# Patient Record
Sex: MALE | Race: WHITE | Employment: FULL TIME | URBAN - METROPOLITAN AREA
[De-identification: names, ages, dates, MRNs, and addresses within clinical notes are randomized per-mention and may not be internally consistent; named-entity substitution may affect disease eponyms.]

---

## 2021-07-06 ENCOUNTER — HOSPITAL ENCOUNTER (INPATIENT)
Age: 31
LOS: 7 days | Discharge: HOME | DRG: 776 | End: 2021-07-14
Attending: EMERGENCY MEDICINE | Admitting: PSYCHIATRY & NEUROLOGY
Payer: MEDICAID

## 2021-07-06 DIAGNOSIS — F32.A DEPRESSION WITH SUICIDAL IDEATION: Primary | ICD-10-CM

## 2021-07-06 DIAGNOSIS — R45.851 DEPRESSION WITH SUICIDAL IDEATION: Primary | ICD-10-CM

## 2021-07-06 PROCEDURE — 99284 EMERGENCY DEPT VISIT MOD MDM: CPT

## 2021-07-06 PROCEDURE — 80179 DRUG ASSAY SALICYLATE: CPT

## 2021-07-06 PROCEDURE — 85025 COMPLETE CBC W/AUTO DIFF WBC: CPT

## 2021-07-06 PROCEDURE — 80143 DRUG ASSAY ACETAMINOPHEN: CPT

## 2021-07-06 PROCEDURE — 36415 COLL VENOUS BLD VENIPUNCTURE: CPT

## 2021-07-06 PROCEDURE — 82077 ASSAY SPEC XCP UR&BREATH IA: CPT

## 2021-07-06 PROCEDURE — 80053 COMPREHEN METABOLIC PANEL: CPT

## 2021-07-07 PROBLEM — F32.A DEPRESSION: Status: ACTIVE | Noted: 2021-07-07

## 2021-07-07 PROBLEM — R45.851 DEPRESSION WITH SUICIDAL IDEATION: Status: ACTIVE | Noted: 2021-07-07

## 2021-07-07 PROBLEM — F32.A DEPRESSION WITH SUICIDAL IDEATION: Status: ACTIVE | Noted: 2021-07-07

## 2021-07-07 LAB
ALBUMIN SERPL-MCNC: 4.4 G/DL (ref 3.5–5)
ALBUMIN/GLOB SERPL: 1.1 {RATIO} (ref 1.1–2.2)
ALP SERPL-CCNC: 105 U/L (ref 45–117)
ALT SERPL-CCNC: 20 U/L (ref 12–78)
AMPHET UR QL SCN: NEGATIVE
ANION GAP SERPL CALC-SCNC: 5 MMOL/L (ref 5–15)
APAP SERPL-MCNC: <10 UG/ML (ref 10–30)
AST SERPL W P-5'-P-CCNC: 12 U/L (ref 15–37)
BARBITURATES UR QL SCN: NEGATIVE
BASOPHILS # BLD: 0 K/UL (ref 0–0.1)
BASOPHILS NFR BLD: 0 % (ref 0–1)
BENZODIAZ UR QL: NEGATIVE
BILIRUB SERPL-MCNC: 0.6 MG/DL (ref 0.2–1)
BUN SERPL-MCNC: 8 MG/DL (ref 6–20)
BUN/CREAT SERPL: 9 (ref 12–20)
CA-I BLD-MCNC: 9.5 MG/DL (ref 8.5–10.1)
CANNABINOIDS UR QL SCN: POSITIVE
CHLORIDE SERPL-SCNC: 103 MMOL/L (ref 97–108)
CO2 SERPL-SCNC: 26 MMOL/L (ref 21–32)
COCAINE UR QL SCN: NEGATIVE
CREAT SERPL-MCNC: 0.9 MG/DL (ref 0.7–1.3)
DATE LAST DOSE: ABNORMAL
DATE LAST DOSE: NORMAL
DIFFERENTIAL METHOD BLD: NORMAL
DRUG SCRN COMMENT,DRGCM: ABNORMAL
EOSINOPHIL # BLD: 0.1 K/UL (ref 0–0.4)
EOSINOPHIL NFR BLD: 1 % (ref 0–7)
ERYTHROCYTE [DISTWIDTH] IN BLOOD BY AUTOMATED COUNT: 13.9 % (ref 11.5–14.5)
ETHANOL SERPL-MCNC: <4 MG/DL
GLOBULIN SER CALC-MCNC: 4.1 G/DL (ref 2–4)
GLUCOSE SERPL-MCNC: 107 MG/DL (ref 65–100)
HCT VFR BLD AUTO: 45.3 % (ref 36.6–50.3)
HGB BLD-MCNC: 15.3 G/DL (ref 12.1–17)
IMM GRANULOCYTES # BLD AUTO: 0 K/UL (ref 0–0.04)
IMM GRANULOCYTES NFR BLD AUTO: 0 % (ref 0–0.5)
LYMPHOCYTES # BLD: 2.5 K/UL (ref 0.8–3.5)
LYMPHOCYTES NFR BLD: 24 % (ref 12–49)
MCH RBC QN AUTO: 30 PG (ref 26–34)
MCHC RBC AUTO-ENTMCNC: 33.8 G/DL (ref 30–36.5)
MCV RBC AUTO: 88.8 FL (ref 80–99)
METHADONE UR QL: NEGATIVE
MONOCYTES # BLD: 0.7 K/UL (ref 0–1)
MONOCYTES NFR BLD: 6 % (ref 5–13)
NEUTS SEG # BLD: 7.2 K/UL (ref 1.8–8)
NEUTS SEG NFR BLD: 69 % (ref 32–75)
NRBC # BLD: 0 K/UL (ref 0–0.01)
NRBC BLD-RTO: 0 PER 100 WBC
OPIATES UR QL: NEGATIVE
PCP UR QL: NEGATIVE
PLATELET # BLD AUTO: 254 K/UL (ref 150–400)
PMV BLD AUTO: 11.6 FL (ref 8.9–12.9)
POTASSIUM SERPL-SCNC: 3.2 MMOL/L (ref 3.5–5.1)
PROT SERPL-MCNC: 8.5 G/DL (ref 6.4–8.2)
RBC # BLD AUTO: 5.1 M/UL (ref 4.1–5.7)
REPORTED DOSE,DOSE: ABNORMAL UNITS
REPORTED DOSE,DOSE: NORMAL UNITS
SALICYLATES SERPL-MCNC: 5.4 MG/DL (ref 2.8–20)
SARS-COV-2, COV2: NOT DETECTED
SODIUM SERPL-SCNC: 134 MMOL/L (ref 136–145)
WBC # BLD AUTO: 10.5 K/UL (ref 4.1–11.1)

## 2021-07-07 PROCEDURE — 80307 DRUG TEST PRSMV CHEM ANLYZR: CPT

## 2021-07-07 PROCEDURE — 87635 SARS-COV-2 COVID-19 AMP PRB: CPT

## 2021-07-07 PROCEDURE — 65220000003 HC RM SEMIPRIVATE PSYCH

## 2021-07-07 PROCEDURE — 74011250637 HC RX REV CODE- 250/637: Performed by: PSYCHIATRY & NEUROLOGY

## 2021-07-07 RX ORDER — OLANZAPINE 5 MG/1
5 TABLET ORAL
Status: DISCONTINUED | OUTPATIENT
Start: 2021-07-07 | End: 2021-07-14 | Stop reason: HOSPADM

## 2021-07-07 RX ORDER — HYDROXYZINE 50 MG/1
50 TABLET, FILM COATED ORAL
Status: DISCONTINUED | OUTPATIENT
Start: 2021-07-07 | End: 2021-07-14 | Stop reason: HOSPADM

## 2021-07-07 RX ORDER — IBUPROFEN 200 MG
1 TABLET ORAL DAILY
Status: DISCONTINUED | OUTPATIENT
Start: 2021-07-07 | End: 2021-07-14 | Stop reason: HOSPADM

## 2021-07-07 RX ORDER — ACETAMINOPHEN 325 MG/1
650 TABLET ORAL
Status: DISCONTINUED | OUTPATIENT
Start: 2021-07-07 | End: 2021-07-14 | Stop reason: HOSPADM

## 2021-07-07 RX ORDER — BENZTROPINE MESYLATE 1 MG/1
1 TABLET ORAL
Status: DISCONTINUED | OUTPATIENT
Start: 2021-07-07 | End: 2021-07-14 | Stop reason: HOSPADM

## 2021-07-07 RX ORDER — CHLORDIAZEPOXIDE HYDROCHLORIDE 10 MG/1
10 CAPSULE, GELATIN COATED ORAL 3 TIMES DAILY
Status: DISCONTINUED | OUTPATIENT
Start: 2021-07-07 | End: 2021-07-07

## 2021-07-07 RX ORDER — TRAZODONE HYDROCHLORIDE 50 MG/1
50 TABLET ORAL
Status: DISCONTINUED | OUTPATIENT
Start: 2021-07-07 | End: 2021-07-14 | Stop reason: HOSPADM

## 2021-07-07 RX ORDER — OLANZAPINE 2.5 MG/1
2.5 TABLET ORAL 2 TIMES DAILY
Status: DISCONTINUED | OUTPATIENT
Start: 2021-07-07 | End: 2021-07-14 | Stop reason: HOSPADM

## 2021-07-07 RX ORDER — ADHESIVE BANDAGE
30 BANDAGE TOPICAL DAILY PRN
Status: DISCONTINUED | OUTPATIENT
Start: 2021-07-07 | End: 2021-07-14 | Stop reason: HOSPADM

## 2021-07-07 RX ADMIN — OLANZAPINE 2.5 MG: 2.5 TABLET, FILM COATED ORAL at 12:54

## 2021-07-07 RX ADMIN — OLANZAPINE 2.5 MG: 2.5 TABLET, FILM COATED ORAL at 21:29

## 2021-07-07 NOTE — BH NOTES
Karyna Carias is a 28 YO  Male admitted from [facility] ED to [facility] under the psychiatric care of Dr. Rolf Saldana. Voluntary. DX:  Depression with Suicidal Ideations. Denies Psychiatric History. Denies medical history. NKA. Patient escorted onto unit by ED staff in a wheelchair. Compliant with safety search by this RN and Anthony Person. /91 HR 81 Temp 97.7 RR 18. Patient presents with inconsistent eye contact, unkempt. Reports increasing depression, decreased appetite \"I haven't eaten in 4 days. I'm starving, but I cannot eat\", decreased sleep. Reports auditory hallucinations, \"voices telling me to do bad things\". Endorses \"years\" of hearing voices. Family history of Schizophrenia, Bipolar - aunts and cousins. Patient denies substance abuse. UDS +THC. Denies alcohol abuse. Smokes 2 ppd. Patient reports marital problems, he and his wife  on Saturday. His mother is his support system, Xi Pelayo is the one that found me curled up in the woods and brought me to the ER\". Patient denies history of sexual, emotional and/or physical abuse. Employed as a  (patios, etc). Patient provided with unit handbook and oriented to unit. Provided toiletries and linens. Asked appropriate questions, polite, calm. Dr. Rolf Saldana contacted for orders. Q15 minute observation maintained for safety. Encouraged patient to seek support from staff as needed. Will follow treatment plan as written and modify as needed.

## 2021-07-07 NOTE — ED PROVIDER NOTES
EMERGENCY DEPARTMENT HISTORY AND PHYSICAL EXAM      Date: 7/6/2021  Patient Name: Tiffanie Wilkinson      History of Presenting Illness     Chief Complaint   Patient presents with    Suicidal       History Provided By: Patient    HPI: Tiffanie Wilkinson, 27 y.o. male with a past medical history significant No significant past medical history presents to the ED with cc of suicidal ideation for past few days. Patient has several plans are to kill himself. However he does not give it a specific one. Patient denies ingestion. Patient denies self injury. Patient denies any other complaints at this time. There are no other complaints, changes, or physical findings at this time. PCP: None        Past History     Past Medical History:  Past Medical History:   Diagnosis Date    Asthma        Past Surgical History:  Past Surgical History:   Procedure Laterality Date    HX TONSILLECTOMY         Family History:  History reviewed. No pertinent family history. Social History:  Social History     Tobacco Use    Smoking status: Current Every Day Smoker    Smokeless tobacco: Never Used   Substance Use Topics    Alcohol use: Not Currently    Drug use: Yes     Types: Marijuana       Allergies:  No Known Allergies      Review of Systems     Review of Systems   Constitutional: Negative. HENT: Negative. Eyes: Negative. Respiratory: Negative. Cardiovascular: Negative. Gastrointestinal: Negative. Endocrine: Negative. Genitourinary: Negative. Musculoskeletal: Negative. Skin: Negative. Allergic/Immunologic: Negative. Neurological: Negative. Hematological: Negative. Psychiatric/Behavioral: Positive for agitation, behavioral problems, decreased concentration and suicidal ideas. The patient is nervous/anxious. All other systems reviewed and are negative. Physical Exam     Physical Exam  Vitals and nursing note reviewed.    Constitutional:       General: He is not in acute distress. Appearance: Normal appearance. He is normal weight. He is not ill-appearing, toxic-appearing or diaphoretic. HENT:      Head: Normocephalic and atraumatic. Right Ear: Tympanic membrane and ear canal normal.      Left Ear: Tympanic membrane and ear canal normal.      Nose: Nose normal. No congestion or rhinorrhea. Mouth/Throat:      Pharynx: Oropharynx is clear. No oropharyngeal exudate or posterior oropharyngeal erythema. Eyes:      General: No scleral icterus. Right eye: No discharge. Left eye: No discharge. Extraocular Movements: Extraocular movements intact. Conjunctiva/sclera: Conjunctivae normal.      Pupils: Pupils are equal, round, and reactive to light. Cardiovascular:      Rate and Rhythm: Normal rate and regular rhythm. Pulses: Normal pulses. Heart sounds: Normal heart sounds. Pulmonary:      Effort: Pulmonary effort is normal. No respiratory distress. Breath sounds: Normal breath sounds. No stridor. No wheezing, rhonchi or rales. Chest:      Chest wall: No tenderness. Abdominal:      General: Abdomen is flat. Bowel sounds are normal. There is no distension. Palpations: Abdomen is soft. Tenderness: There is no abdominal tenderness. There is no right CVA tenderness, left CVA tenderness, guarding or rebound. Musculoskeletal:         General: No swelling, tenderness, deformity or signs of injury. Normal range of motion. Cervical back: Normal range of motion and neck supple. No rigidity or tenderness. Right lower leg: No edema. Left lower leg: No edema. Lymphadenopathy:      Cervical: No cervical adenopathy. Skin:     General: Skin is warm and dry. Coloration: Skin is not jaundiced or pale. Findings: No bruising, erythema, lesion or rash. Neurological:      General: No focal deficit present. Mental Status: He is alert and oriented to person, place, and time.  Mental status is at baseline. Cranial Nerves: No cranial nerve deficit. Sensory: No sensory deficit. Motor: No weakness. Coordination: Coordination normal.      Gait: Gait normal.   Psychiatric:         Thought Content: Thought content normal.         Judgment: Judgment normal.      Comments: Patient is depressed. Patient is a slightly anxious and agitated. He continues to have suicidal ideation. He has said that he is has multiple plan how to kill himself. Lab and Diagnostic Study Results     Labs -     Recent Results (from the past 12 hour(s))   CBC WITH AUTOMATED DIFF    Collection Time: 07/06/21 11:10 PM   Result Value Ref Range    WBC 10.5 4.1 - 11.1 K/uL    RBC 5.10 4. 10 - 5.70 M/uL    HGB 15.3 12.1 - 17.0 g/dL    HCT 45.3 36.6 - 50.3 %    MCV 88.8 80.0 - 99.0 FL    MCH 30.0 26.0 - 34.0 PG    MCHC 33.8 30.0 - 36.5 g/dL    RDW 13.9 11.5 - 14.5 %    PLATELET 056 097 - 138 K/uL    MPV 11.6 8.9 - 12.9 FL    NRBC 0.0 0.0  WBC    ABSOLUTE NRBC 0.00 0.00 - 0.01 K/uL    NEUTROPHILS 69 32 - 75 %    LYMPHOCYTES 24 12 - 49 %    MONOCYTES 6 5 - 13 %    EOSINOPHILS 1 0 - 7 %    BASOPHILS 0 0 - 1 %    IMMATURE GRANULOCYTES 0 0 - 0.5 %    ABS. NEUTROPHILS 7.2 1.8 - 8.0 K/UL    ABS. LYMPHOCYTES 2.5 0.8 - 3.5 K/UL    ABS. MONOCYTES 0.7 0.0 - 1.0 K/UL    ABS. EOSINOPHILS 0.1 0.0 - 0.4 K/UL    ABS. BASOPHILS 0.0 0.0 - 0.1 K/UL    ABS. IMM.  GRANS. 0.0 0.00 - 0.04 K/UL    DF AUTOMATED     METABOLIC PANEL, COMPREHENSIVE    Collection Time: 07/06/21 11:10 PM   Result Value Ref Range    Sodium 134 (L) 136 - 145 mmol/L    Potassium 3.2 (L) 3.5 - 5.1 mmol/L    Chloride 103 97 - 108 mmol/L    CO2 26 21 - 32 mmol/L    Anion gap 5 5 - 15 mmol/L    Glucose 107 (H) 65 - 100 mg/dL    BUN 8 6 - 20 mg/dL    Creatinine 0.90 0.70 - 1.30 mg/dL    BUN/Creatinine ratio 9 (L) 12 - 20      GFR est AA >60 >60 ml/min/1.73m2    GFR est non-AA >60 >60 ml/min/1.73m2    Calcium 9.5 8.5 - 10.1 mg/dL    Bilirubin, total 0.6 0.2 - 1.0 mg/dL AST (SGOT) 12 (L) 15 - 37 U/L    ALT (SGPT) 20 12 - 78 U/L    Alk. phosphatase 105 45 - 117 U/L    Protein, total 8.5 (H) 6.4 - 8.2 g/dL    Albumin 4.4 3.5 - 5.0 g/dL    Globulin 4.1 (H) 2.0 - 4.0 g/dL    A-G Ratio 1.1 1.1 - 2.2     ETHYL ALCOHOL    Collection Time: 07/06/21 11:10 PM   Result Value Ref Range    ALCOHOL(ETHYL),SERUM <4 <01 mg/dL   SALICYLATE    Collection Time: 07/06/21 11:10 PM   Result Value Ref Range    Salicylate level 5.4 2.8 - 20.0 mg/dL    Reported dose date Not provided      Reported dose: Not provided Units   ACETAMINOPHEN    Collection Time: 07/06/21 11:10 PM   Result Value Ref Range    Acetaminophen level <10 (L) 10 - 30 ug/mL    Reported dose date Not provided      Reported dose: Not provided Units   DRUG SCREEN, URINE    Collection Time: 07/07/21 12:36 AM   Result Value Ref Range    AMPHETAMINES Negative Negative      BARBITURATES Negative Negative      BENZODIAZEPINES Negative Negative      COCAINE Negative Negative      METHADONE Negative Negative      OPIATES Negative Negative      PCP(PHENCYCLIDINE) Negative Negative      THC (TH-CANNABINOL) Positive (A) Negative      Drug screen comment        This test is a screen for drugs of abuse in a medical setting only (i.e., they are unconfirmed results and as such must not be used for non-medical purposes, e.g.,employment testing, legal testing). Due to its inherent nature, false positive (FP) and false negative (FN) results may be obtained. Therefore, if necessary for medical care, recommend confirmation of positive findings by GC/MS. Radiologic Studies -   [unfilled]  CT Results  (Last 48 hours)    None        CXR Results  (Last 48 hours)    None          Medical Decision Making and ED Course   - I am the first and primary provider for this patient AND AM THE PRIMARY PROVIDER OF RECORD.     - I reviewed the vital signs, available nursing notes, past medical history, past surgical history, family history and social history. - Initial assessment performed. The patients presenting problems have been discussed, and the staff are in agreement with the care plan formulated and outlined with them. I have encouraged them to ask questions as they arise throughout their visit. Vital Signs-Reviewed the patient's vital signs. Patient Vitals for the past 12 hrs:   Temp Pulse Resp BP SpO2   07/06/21 2351     99 %   07/06/21 2233 98.4 °F (36.9 °C) 64 14 (!) 146/91 99 %       EKG interpretation: (Preliminary):     Records Reviewed: Nursing Notes        ED Course:              Provider Notes (Medical Decision Making):     MDM  Number of Diagnoses or Management Options  Depression with suicidal ideation: new, needed workup  Diagnosis management comments: Differential diagnosis include suicidal ideation, ingestion, drug overdose, self injury, bipolar disorder, schizophrenia. Amount and/or Complexity of Data Reviewed  Clinical lab tests: ordered and reviewed  Tests in the medicine section of CPT®: reviewed and ordered    Risk of Complications, Morbidity, and/or Mortality  Presenting problems: high  Diagnostic procedures: high  Management options: high  General comments: Patient remained stable throughout the course of treatment. Labs were unremarkable. UDS was positive for cannabis. We will medically clear patient at 3:17 AM for psych admissions. Case discussed with Dr. Mark Thorne, psychiatrist on-call. She accepted the patient's. Will admit patient to her service. Consultations:       Consultations:         Procedures and Critical Care       }    NOTES   :  I have spent critical care time involved in lab review, consultations with specialist, family decision- making, bedside attention and documentation. This time excludes time spent in any separate billed procedures. During this entire length of time I was immediately available to the patient .     Lindsey Ferreira DO        Disposition     Disposition: Condition stable and improved    Admitted        Diagnosis     Clinical Impression:   1. Depression with suicidal ideation        Attestations:    Costa Nava, DO    Please note that this dictation was completed with [employer], the computer voice recognition software. Quite often unanticipated grammatical, syntax, homophones, and other interpretive errors are inadvertently transcribed by the computer software. Please disregard these errors. Please excuse any errors that have escaped final proofreading. Thank you.
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
## 2021-07-07 NOTE — GROUP NOTE
ISABEL  GROUP DOCUMENTATION INDIVIDUAL                                                                          Group Therapy Note    Date: 7/7/2021    Group Start Time: 1100  Group End Time: 1200  Group Topic: Education Group - Inpatient    [facility]    Poly Melvin    IP [address] [facility] GROUP DOCUMENTATION GROUP    Group Therapy Note    Attendees: Patients educated and completed safety plans. Patients encouraged to discuss their warning sighs, coping skills, and support systems. Patients encouraged to discuss openly and honestly and also be respectful and supportive of their peers. Attendance: Attended late    Patient's Goal:  Attends activities and groups    Interventions/techniques: Informed, Validated, Promoted peer support, Reinforced and Supported    Follows Directions: Followed directions    Interactions: Interacted appropriately    Mental Status: Calm and Congruent    Behavior/appearance: Attentive and Withdrawn/quiet    Goals Achieved: Able to listen to others      Additional Notes:  Pt attended group with only a few minutes left. Pt reports that he had been speaking with the doctor. Pt provided with safety plan and instructed on how to fill it out and to keep it to give to his . Pt reported understanding and reported that he would fill it out today.     Chai Pederson

## 2021-07-07 NOTE — ED NOTES
Pt's room psych proofed. Pt's belongings placed in belongings bag and stored at nurses  2 with pt's label. Pt in green gown. Observer monitoring sheet started on pt. Pt's mother at bedside. No signs of acute distress noted.

## 2021-07-07 NOTE — ED TRIAGE NOTES
SI \"I'm done walking around here. \" AH command telling Pt to do bad things. Chronic depression and Suicidal thoughts, recent change, \"being more open about it. \" No specific plan.

## 2021-07-07 NOTE — ED NOTES
TRANSFER - OUT REPORT:    Verbal report given to Britney(name) on [facility]  being transferred to [address] [place] [address](unit) for routine progression of care       Report consisted of patients Situation, Background, Assessment and   Recommendations(SBAR). Information from the following report(s) SBAR was reviewed with the receiving nurse. Lines:       Opportunity for questions and clarification was provided.       Patient transported with:   N[place]ble

## 2021-07-08 PROCEDURE — 74011250637 HC RX REV CODE- 250/637: Performed by: PSYCHIATRY & NEUROLOGY

## 2021-07-08 PROCEDURE — 65220000003 HC RM SEMIPRIVATE PSYCH

## 2021-07-08 RX ORDER — CITALOPRAM 10 MG/1
10 TABLET ORAL DAILY
Status: DISCONTINUED | OUTPATIENT
Start: 2021-07-09 | End: 2021-07-14 | Stop reason: HOSPADM

## 2021-07-08 RX ADMIN — OLANZAPINE 2.5 MG: 2.5 TABLET, FILM COATED ORAL at 09:06

## 2021-07-08 RX ADMIN — OLANZAPINE 2.5 MG: 2.5 TABLET, FILM COATED ORAL at 21:43

## 2021-07-08 NOTE — CONSULTS
History & Physical    Primary Care Provider: None  Source of Information: Patient/family     History of Presenting Illness:   Adonay Fuentes is a 27 y.o. male who was admitted to the behavioral health unit yesterday with suicidal ideation. Medical history is significant only for asthma as a young child. Review of Systems:  A comprehensive review of systems was negative except for that written in the History of Present Illness. Past Medical History:   Diagnosis Date    Asthma         Past Surgical History:   Procedure Laterality Date    HX TONSILLECTOMY         Prior to Admission medications    Not on File       No Known Allergies     History reviewed. No pertinent family history. Social History     Socioeconomic History    Marital status: LEGALLY      Spouse name: Not on file    Number of children: Not on file    Years of education: Not on file    Highest education level: Not on file   Tobacco Use    Smoking status: Current Every Day Smoker    Smokeless tobacco: Never Used   Substance and Sexual Activity    Alcohol use: Not Currently    Drug use: Yes     Types: Marijuana    Sexual activity: Yes     Partners: Female     Birth control/protection: None     Social Determinants of Health     Financial Resource Strain:     Difficulty of Paying Living Expenses:    Food Insecurity:     Worried About Running Out of Food in the Last Year:     [address] in the Last Year:    Transportation Needs:     Lack of Transportation (Medical):      Lack of Transportation (Non-Medical):    Physical Activity:     Days of Exercise per Week:     Minutes of Exercise per Session:    Stress:     Feeling of Stress :    Social Connections:     Frequency of Communication with Friends and Family:     Frequency of Social Gatherings with Friends and Family:     Attends Jewish Services:     Active Member of Clubs or Organizations:     Attends Club or Organization Meetings:     Marital Status:             CODE STATUS:  DNR    Full    Other      Objective:     Physical Exam:     Visit Vitals  /76 (BP 1 Location: Left upper arm, BP Patient Position: At rest;Lying)   Pulse 81   Temp 98.6 °F (37 °C)   Resp 17   Ht 5' 10\" (1.778 m)   Wt 63.5 kg (140 lb)   SpO2 99%   BMI 20.09 kg/m²      O2 Device: None (Room air)    General:  Alert, cooperative, no distress, appears stated age. Head:  Normocephalic, without obvious abnormality, atraumatic. Eyes:  Conjunctivae/corneas clear. PERRL, EOMs intact. Nose: Nares normal. Septum midline. Mucosa normal. No drainage or sinus tenderness. Throat: Lips, mucosa, and tongue normal. Teeth and gums normal.   Neck: Supple, symmetrical, trachea midline, no adenopathy, thyroid: no enlargement/tenderness/nodules, no carotid bruit and no JVD. Back:   Symmetric, no curvature. ROM normal. No CVA tenderness. Lungs:   Clear to auscultation bilaterally. Chest wall:  No tenderness or deformity. Heart:  Regular rate and rhythm, S1, S2 normal, no murmur, click, rub or gallop. Abdomen:   Soft, non-tender. Bowel sounds normal. No masses,  No organomegaly. Extremities: Extremities normal, atraumatic, no cyanosis or edema. Pulses: 2+ and symmetric all extremities. Skin: Skin color, texture, turgor normal. No rashes or lesions   Neurologic: CNII-XII intact. No motor or sensory deficits. 24 Hour Results:    No results found for this or any previous visit (from the past 24 hour(s)).       Imaging:   No orders to display           Assessment:   History of mild intermittent asthma, stable    Depression with suicidal ideation      Plan:   Patient is stable from medical standpoint    Home medications were reviewed    Signed By: Suhas Gallegos MD     July 8, 2021
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
## 2021-07-08 NOTE — BH NOTES
PSA PART II ADDITIONAL INFORMATION        Access To Fire Arms: No    Substance Use: YES    Last Use: 7/6/21    Type of Substance: THC use    Frequency of Use: daily    Request to See : NO    If yes, notified : NO    Release of Information Signed: YES    Release of Information Signed For: momZuleyka [phone], wife, Karlo Dong (mom will have number)- can only confirm safety of her and children, no location to be shared.

## 2021-07-08 NOTE — PROGRESS NOTES
Problem: Depressed Mood (Adult/Pediatric)  Goal: *STG: Remains safe in hospital  Outcome: Progressing Towards Goal     Problem: Depressed Mood (Adult/Pediatric)  Goal: *STG: Complies with medication therapy  Outcome: Progressing Towards Goal     Patient has been safe in the hospital.  Patient was medication compliant. Patient remains on close observation. Patient has been alert, oriented, cooperative and pleasant on approach. Patient spent much if his time in bed. He attended music group. Patient said his mood was fine. He said he is mostly tired from his thoughts and from not eating. Medication compliant. Continue to assess. Since going to bed, patient has been laying down quietly with his eyes closed.
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
## 2021-07-08 NOTE — BH NOTES
Patient up to the dayroom for meals, ate 100%. Returned back to his room after meals. Medication compliant. No side effects noted. Pleasant on approach. Denies SI/HI. Denies AVH thus far today. Attends select groups. No physical complaints voiced. Presently sitting in the dayroom with peers. Remains on CO. Continue to monitor.

## 2021-07-08 NOTE — GROUP NOTE
[facility] GROUP DOCUMENTATION INDIVIDUAL                                                                          Group Therapy Note    Date: 7/8/2021    Group Start Time: 1300  Group End Time: 1400  Group Topic: Education Group - Inpatient    [facility] MANAGEMENT    [place]    [facility] GROUP DOCUMENTATION GROUP    Group Therapy Note    Attendees: 7/13    Patients were asked how they were feeling as a check-in question. Therapist facilitated group to address what trauma is and the common reactions to trauma. Pts were encouraged to share experiences with trauma and talk about healthy ways of coping. Attendance: Attended    Patient's Goal:  Pt was able to respond to check-in question that they were feeling negative and angry    Interventions/techniques: Informed, Validated and Supported    Follows Directions: Followed directions    Interactions: Interacted appropriately    Mental Status: Calm and Congruent    Behavior/appearance: Attentive and Cooperative    Goals Achieved: Able to engage in interactions, Able to listen to others, Able to give feedback to another, Able to reflect/comment on own behavior and Able to manage/cope with feelings      Additional Notes:  Pt was very attentive during group. Pt was able to empathize with another peer and display empathy to others. Pt was ble to self-disclose and identify trauma as well as feelings and triggers.     Kala Brush

## 2021-07-08 NOTE — GROUP NOTE
IP  GROUP DOCUMENTATION INDIVIDUAL                                                                          Group Therapy Note    Date: 7/7/2021    Group Start Time: 1945  Group End Time: 2030  Group Topic: Recreational/Music Therapy    [facility] 2  NON ACUTE    Allegra Slater    IP [address] [facility] GROUP DOCUMENTATION GROUP    Group Therapy Note    Attendees: 6/11  Introduce healthy leisure task skill as positive way to cope and manage stress         Attendance: Attended    Patient's Goal: STG: Attends activities and groups        Interventions/techniques: Art integration and Supported    Follows Directions: Followed directions    Interactions: Interacted appropriately    Mental Status: Calm and Flat    Behavior/appearance: Attentive and Cooperative    Goals Achieved: Able to engage in interactions and Able to listen to others      Additional Notes: Pt attended group and actively participated. Was receptive to intervention. Was able to receive leisure packet. Pt sat quietly in group entire session. Used leisure time constructively.      MAURICIO AguirreS

## 2021-07-08 NOTE — GROUP NOTE
[facility]  GROUP DOCUMENTATION INDIVIDUAL                                                                          Group Therapy Note    Date: 7/8/2021    Group Start Time: 1400  Group End Time: 1500  Group Topic: Special Track - Drug Topic    [facility]    Poly Melvin    IP [address] [facility] GROUP DOCUMENTATION GROUP    Group Therapy Note    Attendees: Patients discussed the protracted withdrawal worksheet. Patients informed on what protracted withdrawal is and ways to combat it. Patients encouraged to share openly and honestly. Patients encouraged to support each other and be respectful. Attendance: Did not attend        Additional Notes:  Pt encouraged but did not attend group.     Lucho Smith

## 2021-07-08 NOTE — GROUP NOTE
[facility] GROUP DOCUMENTATION INDIVIDUAL                                                                          Group Therapy Note    Date: 7/8/2021    Group Start Time: 1115  Group End Time: 1200  Group Topic: Process Group - Inpatient    [facility] 2 BEHA HLTH ACUTE    Charline Kumar  GROUP DOCUMENTATION GROUP    Group Therapy Note    Attendees: 4/12    Process: Pts were encouraged to share something they want to let go of as a check in question. Pts were then encouraged to share what brought them to the hospital and items discussed was environmental factors and support systems.  Pts were then asked to reflect on group         Attendance: Did not attend  Additional Notes:  Pt was encouraged to attend but chose not to do so    [employer]

## 2021-07-09 PROCEDURE — 74011250637 HC RX REV CODE- 250/637: Performed by: PSYCHIATRY & NEUROLOGY

## 2021-07-09 PROCEDURE — 65220000003 HC RM SEMIPRIVATE PSYCH

## 2021-07-09 RX ADMIN — OLANZAPINE 2.5 MG: 2.5 TABLET, FILM COATED ORAL at 21:58

## 2021-07-09 RX ADMIN — CITALOPRAM HYDROBROMIDE 10 MG: 10 TABLET ORAL at 08:37

## 2021-07-09 RX ADMIN — OLANZAPINE 2.5 MG: 2.5 TABLET, FILM COATED ORAL at 08:37
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
## 2021-07-09 NOTE — PROGRESS NOTES
Problem: Depressed Mood (Adult/Pediatric)  Goal: *STG: Verbalizes anger, guilt, and other feelings in a constructive manor  Outcome: Progressing Towards Goal     Problem: Depressed Mood (Adult/Pediatric)  Goal: *STG: Remains safe in hospital  Outcome: Progressing Towards Goal     Problem: Depressed Mood (Adult/Pediatric)  Goal: *STG: Complies with medication therapy  Outcome: Progressing Towards Goal     Patient was able to verbalize his feelings. Patient remains on close observation. Patient has been alert, oriented, cooperative and pleasant. Patient has been up on the unit. Patient was medication compliant. Since going to bed, patient has been laying down quietly with his eyes closed.

## 2021-07-09 NOTE — BH NOTES
Patient up to the dayroom for meals, ate 100%. Medication compliant. No side effects noted. Stated that he has blackouts & he does not remember what he did before admission to the hospital. Attends groups. Social with select peers. No physical complaints voiced. Denies SI/HI. Denies AVH. Remains on CO. Continue to monitor.

## 2021-07-09 NOTE — GROUP NOTE
IP  GROUP DOCUMENTATION INDIVIDUAL                                                                          Group Therapy Note    Date: 7/8/2021    Group Start Time: 2000  Group End Time: 2045  Group Topic: Recreational/Music Therapy    [facility] 2  NON ACUTE    Jose Silva    IP [address] [facility] GROUP DOCUMENTATION GROUP    Group Therapy Note    Attendees: 7/13  Introduced healthy leisure task skill group as positive way to cope and manage mood. Attendance: Attended    Patient's Goal: STG: Attends activities and groups      Interventions/techniques: Art integration and Supported    Follows Directions: Followed directions    Interactions: Interacted appropriately    Mental Status: Calm and Flat    Behavior/appearance: Attentive and Cooperative    Goals Achieved: Able to engage in interactions and Able to listen to others      Additional Notes: Attended group and actively participated. Accepted leisure activity packet and worked on task in group. Was able to select songs to listen to in group. Interacted with staff and peers. Used leisure time constructively.      TEMITOPE Lu

## 2021-07-09 NOTE — BH NOTES
Collateral:    Writer contacted pt's mother, Purnima Johnson, who was very guarded with sharing information. She reported finding pt in the woods because his work friends told her the area they dropped him off at. Writer explained that pt signed MONSTER for his wife, Danial Torrez, to confirm if she and family were safe. Pt gave permission for Mcihelle to provide writer with Tere's phone number as she has pt's phone. Michelle reported she did not want to do that. Writer educated her on pt having the right to make this decision, and that she only had permission to confirm safety. Michelle reported seeing Razia Zheng outside of her ex's house, which is a few blocks from Razia Zheng and pt's home. Michelle requested writer call back in 30 minutes. Writer called back and Michelle reported the landlord had spoken with Razia Zheng, but now cannot get in touch with her and provided the Satya Garcia known phone number for her\" [phone]. Writer contacted two times from different numbers and phone is set to \" do not disturb\". Michelle shared that police call or come to her home daily to request location of pt, but she will only state that he is in the hospital, and not which one. Writer encouraged Michelle to cooperate with police if a crime has been committed as she indicated pt is likely to go to detention. Michelle would not share details, but agreed to provide police with this writer's phone number as they continue to come to the home looking for pt.

## 2021-07-09 NOTE — GROUP NOTE
[facility]  GROUP DOCUMENTATION INDIVIDUAL                                                                          Group Therapy Note    Date: 7/9/2021    Group Start Time: 1100  Group End Time: 1200  Group Topic: Process Group - Inpatient    [facility]    Poly Melvin    IP [address] [facility] GROUP DOCUMENTATION GROUP    Group Therapy Note    Attendees: Patients encouraged to discuss the things that brought him to the hospital, the things that have been bothering them, the things that have been weighing them down. Patients encouraged to share openly and honestly and to bee respectful and supportive of their peers. Themes surrounding self judgement and controlling their emotions emerged and were discussed. Attendance: Attended    Patient's Goal:  Attends activities and groups    Interventions/techniques: Validated, Promoted peer support, Reinforced and Supported    Follows Directions: Followed directions    Interactions: Interacted appropriately    Mental Status: Calm and Congruent    Behavior/appearance: Attentive, withdrawn/quiet    Goals Achieved: Able to listen to others      Additional Notes:  Pt attended group and was engaged. Pt was withdrawn throughout and quiet throughout but was seen nodding along to peers.     Wally Ovalle

## 2021-07-09 NOTE — PROGRESS NOTES
Progress Note  Date:2021       Room:[facility]  Patient Baylee Gentile     YOB: 1990     Age:30 y.o. Subjective    Subjective   Mr. Graf 66-year-old  male who reports feeling slightly better. He denies having any active hallucinations or hearing voices today as much. He states he was able to sleep slightly better. He states he does not remember what happened and reports having blacked out and hearing voices before coming into the hospital.  He reports he has been hearing voices for a long time. Been bad things but has never got help. He currently denies having any suicidal or homicidal feelings. Sleep and appetite has been fair. Mental status examination-patient is alert oriented to name place person presents anxious states to himself. At the time of admission presented with increasing depression and suicidal ideation and anxious with reported auditory hallucinations. He had reported auditory hallucinations voices telling him to do good things and bad things. He did not elaborate further. Insight judgment coping remains impaired, but improving  Review of Systems  Objective         Vitals Last 24 Hours:  TEMPERATURE:  Temp  Av.6 °F (37 °C)  Min: 98.6 °F (37 °C)  Max: 98.6 °F (37 °C)  RESPIRATIONS RANGE: Resp  Av  Min: 17  Max: 17  PULSE OXIMETRY RANGE: No data recorded  PULSE RANGE: Pulse  Av  Min: 81  Max: 81  BLOOD PRESSURE RANGE: Systolic (07UWJ), BYC:978 , Min:119 , LWT:744   ; Diastolic (99JVY), YXF:41, Min:76, Max:76    I/O (24Hr):   No intake or output data in the 24 hours ending 21  Objective  Labs/Imaging/Diagnostics    Labs:  CBC:  Recent Labs     21  2310   WBC 10.5   RBC 5.10   HGB 15.3   HCT 45.3   MCV 88.8   RDW 13.9        CHEMISTRIES:  Recent Labs     21  2310   *   K 3.2*      CO2 26   BUN 8   CA 9.5   PT/INR:No results for input(s): INR, INREXT in the last 72 hours. No lab exists for component: PROTIME  APTT:No results for input(s): APTT in the last 72 hours. LIVER PROFILE:  Recent Labs     07/06/21  2310   AST 12*   ALT 20     Lab Results   Component Value Date/Time    ALT (SGPT) 20 07/06/2021 11:10 PM    AST (SGOT) 12 (L) 07/06/2021 11:10 PM    Alk. phosphatase 105 07/06/2021 11:10 PM    Bilirubin, total 0.6 07/06/2021 11:10 PM       Imaging Last 24 Hours:  No results found. Assessment//Plan   Active Problems:    Depression with suicidal ideation (7/7/2021)      Depression (7/7/2021)      Assessment & Plan  Continue process group  Reaching out to family for safety of others and to obtain further collateral relating to his reasons for hospitalization and other psychosocial and legal situations. Reason for his underlying trigger to his mental health worsening. Has been tolerating his medications.   No SI HI  Family meeting reviewed  No side effects from medications reported  Start Celexa 10 mg p.o. daily to address depression anxiety  Continue Zyprexa 2.5 twice daily to address his mood reported hallucinations      Current Facility-Administered Medications:     [START ON 7/9/2021] citalopram (CELEXA) tablet 10 mg, 10 mg, Oral, DAILY, Chelsie Macias MD    OLANZapine (ZyPREXA) tablet 5 mg, 5 mg, Oral, Q6H PRN, Ady Parekh MD    benztropine (COGENTIN) tablet 1 mg, 1 mg, Oral, BID PRN, Ady Parekh MD    hydrOXYzine HCL (ATARAX) tablet 50 mg, 50 mg, Oral, TID PRN, Ady Parekh MD    traZODone (DESYREL) tablet 50 mg, 50 mg, Oral, QHS PRN, Ady Parekh MD    acetaminophen (TYLENOL) tablet 650 mg, 650 mg, Oral, Q4H PRN, Chelsie Macias MD    magnesium hydroxide (MILK OF MAGNESIA) 400 mg/5 mL oral suspension 30 mL, 30 mL, Oral, DAILY PRN, Chelsie Macias MD    nicotine (NICODERM CQ) 21 mg/24 hr patch 1 Patch, 1 Patch, TransDERmal, DAILY, Chelsie Macias MD, 1 Patch at 07/08/21 0906    OLANZapine (ZyPREXA) tablet 2.5 mg, 2.5 mg, Oral, BID, Chelsie Macias MD, 2.5 mg at 07/08/21 4408  Electronically signed by Molly Brown MD on 7/8/2021 at 8:11 PM

## 2021-07-09 NOTE — GROUP NOTE
IP  GROUP DOCUMENTATION INDIVIDUAL                                                                          Group Therapy Note    Date: 7/9/2021    Group Start Time: 1300  Group End Time: 1400  Group Topic: Education Group - Inpatient    [facility] CARE MANAGEMENT    Alonzo Clifton    IP [address] [facility] GROUP DOCUMENTATION GROUP    Group Therapy Note    Attendees: 6/14    Pts were encouraged to say how they were feeling as a check-in question. Pts were given Mindfulness & Meditation worksheet. Pts were encouraged to participate in a guided meditation to address the focus of breathing. Pts were then asked to share after the activity how they felt afterwards and to compare to how they felt before the meditation. Attendance: Attended    Patient's Goal:  Pt responded to check-in question that he was feeling negative, pt did not want to self-disclose about negative feelings    Interventions/techniques: Informed, Validated and Supported    Follows Directions: Followed directions    Interactions: Interacted appropriately    Mental Status: Calm and Congruent    Behavior/appearance: Attentive and Cooperative    Goals Achieved: Able to engage in interactions, Able to listen to others, Able to give feedback to another and Able to reflect/comment on own behavior      Additional Notes:  Pt was attentive during group. Pt was calm and did not speak during group. Pt appeared to be upset about something.  Pt participated in guided meditation    Ryan Graves

## 2021-07-10 PROCEDURE — 74011250637 HC RX REV CODE- 250/637: Performed by: PSYCHIATRY & NEUROLOGY

## 2021-07-10 PROCEDURE — 65220000003 HC RM SEMIPRIVATE PSYCH

## 2021-07-10 RX ADMIN — CITALOPRAM HYDROBROMIDE 10 MG: 10 TABLET ORAL at 08:44

## 2021-07-10 RX ADMIN — OLANZAPINE 2.5 MG: 2.5 TABLET, FILM COATED ORAL at 22:03

## 2021-07-10 RX ADMIN — OLANZAPINE 2.5 MG: 2.5 TABLET, FILM COATED ORAL at 08:44

## 2021-07-10 NOTE — PROGRESS NOTES
Progress Note  Date: 2021       Room:[facility]  Patient Mag Paulson     YOB: 1990     Age:30 y.o. Subjective    Subjective   Patient reports he has been feeling better with his anxiety. He is not hearing any voices. He wants to know more about his medication. She denies remembering anything and that he had a blood drawn. He states he may go to half-way. He would not elaborate further. Mental status examination-patient is alert oriented to name place person presents anxious but much more engaging in conversation denies any suicidal homicidal ideations today denies any auditory visual hallucinations. No evidence of any active psychosis noted. Insight judgment and coping remains impaired but continuing to improve  Review of Systems  Objective         Vitals Last 24 Hours:  TEMPERATURE:  Temp  Av.1 °F (36.7 °C)  Min: 97.8 °F (36.6 °C)  Max: 98.4 °F (36.9 °C)  RESPIRATIONS RANGE: Resp  Av  Min: 18  Max: 18  PULSE OXIMETRY RANGE: SpO2  Av %  Min: 100 %  Max: 100 %  PULSE RANGE: Pulse  Av.3  Min: 55  Max: 70  BLOOD PRESSURE RANGE: Systolic (10FSU), SDY:321 , Min:109 , EGH:701   ; Diastolic (65BDC), FAD:10, Min:60, Max:69    I/O (24Hr): No intake or output data in the 24 hours ending 07/10/21 1133  Objective  Labs/Imaging/Diagnostics    Labs:  CBC:No results for input(s): WBC, RBC, HGB, HCT, MCV, RDW, PLT, HGBEXT, HCTEXT, PLTEXT in the last 72 hours. CHEMISTRIES:No results for input(s): NA, K, CL, CO2, BUN, CA, PHOS, MG in the last 72 hours. No lab exists for component: CREATININE, GLUCOSEPT/INR:No results for input(s): INR, INREXT in the last 72 hours. No lab exists for component: PROTIME  APTT:No results for input(s): APTT in the last 72 hours. LIVER PROFILE:No results for input(s): AST, ALT in the last 72 hours.     No lab exists for component: BILIDIR, BILITOT, [facility]  Lab Results   Component Value Date/Time    ALT (SGPT) 20 07/06/2021 11:10 PM    AST (SGOT) 12 (L) 07/06/2021 11:10 PM    Alk. phosphatase 105 07/06/2021 11:10 PM    Bilirubin, total 0.6 07/06/2021 11:10 PM       Imaging Last 24 Hours:  No results found. Assessment//Plan   Active Problems:    Depression with suicidal ideation (7/7/2021)      Depression (7/7/2021)      Assessment & Plan  Provided support psychoeducation  Continue to encourage to sharing information that he feels comfortable. He does admit to tolerating medications and medicines helping him. Continue to obtain further collateral  Patient was discussed in the treatment team meeting. No active SI HI voiced     no statements no persecutory delusions noted or reported.         Current Facility-Administered Medications:     citalopram (CELEXA) tablet 10 mg, 10 mg, Oral, DAILY, Chelsie Macias MD, 10 mg at 07/10/21 0844    OLANZapine (ZyPREXA) tablet 5 mg, 5 mg, Oral, Q6H PRN, Nick Li MD    benztropine (COGENTIN) tablet 1 mg, 1 mg, Oral, BID PRN, Nick Li MD    hydrOXYzine HCL (ATARAX) tablet 50 mg, 50 mg, Oral, TID PRN, Nick Li MD    traZODone (DESYREL) tablet 50 mg, 50 mg, Oral, QHS PRN, Nick Li MD    acetaminophen (TYLENOL) tablet 650 mg, 650 mg, Oral, Q4H PRN, Chelsie Macias MD    magnesium hydroxide (MILK OF MAGNESIA) 400 mg/5 mL oral suspension 30 mL, 30 mL, Oral, DAILY PRN, Chelsie Macias MD    nicotine (NICODERM CQ) 21 mg/24 hr patch 1 Patch, 1 Patch, TransDERmal, DAILY, Chelsie Macias MD, 1 Patch at 07/10/21 0843    OLANZapine (ZyPREXA) tablet 2.5 mg, 2.5 mg, Oral, BID, Nick Li MD, 2.5 mg at 07/10/21 0887    lectronically signed by Osei Talbert MD on 7/10/2021 at 11:33 AM

## 2021-07-10 NOTE — BH NOTES
Patient pleasant upon approach, affect wnl, with good eye contact. Patient was medication compliant, and social with writer. He denied SI, HI, AH, VH, depression and anxiety. Patient reported having AH and SI, only when he was angry. Patient remains on close observation, Q 15 minute checks.

## 2021-07-10 NOTE — GROUP NOTE
IP  GROUP DOCUMENTATION INDIVIDUAL                                                                          Group Therapy Note    Date: 7/9/2021    Group Start Time: 2000  Group End Time: 2045  Group Topic: Recreational/Music Therapy    [facility] 2 BH NON ACUTE    Roselee Batter    IP [facility] GROUP DOCUMENTATION GROUP    Group Therapy Note    Attendees: 6/15  Introduced healthy leisure task skill as positive way to cope and manage stress. .          Attendance: Attended    Patient's Goal: STG: Attends activities and groups      Interventions/techniques: Art integration and Supported    Follows Directions: Followed directions    Interactions: Interacted appropriately    Mental Status: Calm and Flat    Behavior/appearance: Attentive and Cooperative    Goals Achieved: Able to engage in interactions and Able to listen to others      Additional Notes:  Pt attended group and actively participated. Received leisure activity packet. Selected songs to listen to in group and worked on puzzles and art with encouragement.  Was receptive to intervention    Will Braswell , [address]

## 2021-07-10 NOTE — GROUP NOTE
ISABEL  GROUP DOCUMENTATION INDIVIDUAL                                                                          Group Therapy Note    Date: 7/10/2021    Group Start Time: 1300  Group End Time: 1400  Group Topic: Education Group - Inpatient    [facility] 2 [facility] HLTH ACUTE    Charline Kumar    IS[facility] GROUP DOCUMENTATION GROUP    Group Therapy Note    Attendees: 3/15    Process: Pts were asked what goals they are hoping to achieve as a check in question. Pts were then encouraged to participate in an activity regarding goals. Pts were asked to share goals they have made some progress towards and goals they have achieved. Pts were then asked to reflect on group         Attendance: Attended    Patient's Goal:  Pt said that he is proud of himself for coming here to get help    Interventions/techniques: Validated, Promoted peer support, Provide feedback and Supported    Follows Directions: Followed directions    Interactions: Interacted appropriately    Mental Status: Calm, Congruent and Flat    Behavior/appearance: Attentive, Motivated, Neatly groomed and Withdrawn/quiet    Goals Achieved: Able to engage in interactions, Able to listen to others, Able to give feedback to another, Able to reflect/comment on own behavior and Able to manage/cope with feelings      Additional Notes:  Pt spoke about how this is his first time receiving help. Pt discussed wanting to work on his anxiety and anger management. 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  Pt is making progress towards goals by attending and participating in group    [facility]

## 2021-07-11 PROCEDURE — 65220000003 HC RM SEMIPRIVATE PSYCH

## 2021-07-11 PROCEDURE — 74011250637 HC RX REV CODE- 250/637: Performed by: PSYCHIATRY & NEUROLOGY

## 2021-07-11 RX ADMIN — OLANZAPINE 2.5 MG: 2.5 TABLET, FILM COATED ORAL at 09:00

## 2021-07-11 RX ADMIN — OLANZAPINE 2.5 MG: 2.5 TABLET, FILM COATED ORAL at 21:13

## 2021-07-11 RX ADMIN — CITALOPRAM HYDROBROMIDE 10 MG: 10 TABLET ORAL at 09:00

## 2021-07-11 RX ADMIN — TRAZODONE HYDROCHLORIDE 50 MG: 50 TABLET ORAL at 21:13

## 2021-07-11 NOTE — GROUP NOTE
[facility] GROUP DOCUMENTATION INDIVIDUAL                                                                          Group Therapy Note    Date: 7/11/2021    Group Start Time: 1300  Group End Time: 1400  Group Topic: Education Group - Inpatient    [facility] 2  NON ACUTE    Kia Handing    [facility] GROUP DOCUMENTATION GROUP    Group Therapy Note    Attendees: 6/15  Introduce Defense Mechanisms, define Defense Mechanisms,and identify ways pt has used [employer] as way to coping with stressors and verbalize positive ways of coping. Attendance: Attended    Patient's Goal: STG: Demonstrates reduction in symptoms and increase in insight into coping skills/future focused         Interventions/techniques: Informed, Provide feedback and Supported    Follows Directions: Followed directions    Interactions: Interacted appropriately    Mental Status: Calm and Flat    Behavior/appearance: Attentive and Cooperative    Goals Achieved: Able to engage in interactions and Able to listen to others      Additional Notes: Pt attended group and actively participated in group discussion. Was able to relate to use of defense mechanisms and able to give personal examples. PT verbalized use of avoidance to help him manage difficult situations. PT had difficulty identifying positive ways of coping.      Surya Summers

## 2021-07-11 NOTE — BH NOTES
Patient quietly active on the unit. Affect broad at times, pleasant upon approach. Patient was medication compliant. He denied SI, HI, AH, VH, depression and anxiety. Patient was moved into 239 due to the unpredictabilility of his room mate, patient was compliant with this move. He remains on close observation, Q 15 minute checks.

## 2021-07-11 NOTE — PROGRESS NOTES
Problem: Depressed Mood (Adult/Pediatric)  Goal: *STG: Remains safe in hospital  Outcome: Progressing Towards Goal     Problem: Depressed Mood (Adult/Pediatric)  Goal: *STG: Complies with medication therapy  Outcome: Progressing Towards Goal     Patient has been safe in the hospital.  Patient was medication complaint. Patient remains on close observation. Patient has been alert, oriented, cooperative and pleasant. Patient has been up on the unit. Patient has been alert, oriented, cooperative and pleasant. Patient has not voiced any depression, anxiety, SI or HI. Patient was medication compliant. Continue to assess. Since going to bed, patient has been laying down quietly with his eyes closed.

## 2021-07-11 NOTE — GROUP NOTE
[facility]  GROUP DOCUMENTATION INDIVIDUAL                                                                          Group Therapy Note    Date: 7/11/2021    Group Start Time: 1100  Group End Time: 2913  Group Topic: Process Group - Inpatient    [facility] CARE MANAGEMENT    James Sensor    IP [employer] GROUP DOCUMENTATION GROUP    Group Therapy Note    Pts participated in process group therapy. Pts began the group with a check in questions and why they were brought to the hospital. Therapist and pts processed through their emotions, thought and feelings during group. Group closed with a closing question.      Attendees: 4/15         Attendance: Did not attend        Lucy Other

## 2021-07-12 PROCEDURE — 74011250637 HC RX REV CODE- 250/637: Performed by: PSYCHIATRY & NEUROLOGY

## 2021-07-12 PROCEDURE — 65220000003 HC RM SEMIPRIVATE PSYCH

## 2021-07-12 RX ADMIN — OLANZAPINE 2.5 MG: 2.5 TABLET, FILM COATED ORAL at 10:15

## 2021-07-12 RX ADMIN — CITALOPRAM HYDROBROMIDE 10 MG: 10 TABLET ORAL at 10:15

## 2021-07-12 RX ADMIN — OLANZAPINE 2.5 MG: 2.5 TABLET, FILM COATED ORAL at 21:57

## 2021-07-12 NOTE — BH NOTES
Patient alert and oriented. Ate breakfast in solarium. Ambulating in hallway. Socializing while in solarium. Denies suicidal or homicidal ideations. Denies auditory or visual hallucinations. Took medications as ordered. No distress noted.

## 2021-07-12 NOTE — GROUP NOTE
IS[facility]  GROUP DOCUMENTATION INDIVIDUAL                                                                          Group Therapy Note    Date: 7/12/2021    Group Start Time: 1100  Group End Time: 1200  Group Topic: Process Group - Inpatient    [facility] OP    Poly Melvin GROUP DOCUMENTATION GROUP    Group Therapy Note    Attendees: Patients encouraged to discuss the things that brought them to the hospital, the things that have been bothering them, the things that have been weighing them down. Patients encouraged to share openly and honestly. Patients encouraged to be respectful and supportive of their peers. Themes surrounding family, judgement, and self control emerged and were discussed. Attendance: Attended    Patient's Goal:  Attends activities and groups    Interventions/techniques: Validated, Promoted peer support, Reinforced and Supported    Follows Directions: Followed directions    Interactions: Interacted appropriately    Mental Status: Calm and Congruent    Behavior/appearance: Attentive and Cooperative    Goals Achieved: Able to engage in interactions, Able to listen to others, Able to reflect/comment on own behavior, Able to self-disclose, Discussed coping and Identified feelings      Additional Notes:  Pt attended group and was engaged. Pt shared that he feels that he is ready to go home. Pt shared that he knows he is ready as he feels much better than prior to admission, reports feeling stagnant and ready to take on next steps.     Elizabeth Skill

## 2021-07-12 NOTE — BH NOTES
Behavioral Health Treatment Team Note     Patient goal(s) for today: To continue feeling better  Treatment team focus/goals: To continue group therapy and medication management     Progress note: The patient was presenting with a mostly bright affect and congruent mood. He expressed that he is feeling better now, and likes the medication that he is taking. He denied SI/HI and AH/VH's. He said that he would like to leave in order to address all of the court issues he now has, knowing that he has a warrant out for him. The therapist explained that they would be contacting D-19 to assess him for appropriateness to discharge, and explained what those potential outcomes would be from their evaluation. He asked the therapist what she felt like would be good for him, asking if it would look better for the courts if he were to stay here until the doctor lets him go. The therapist encouraged this option instead, which he said that he would take, and stay voluntarily without getting D-19 involved.      LOS:  5  Expected LOS: 5-7 days     Insurance info/prescription coverage:  [facility]  Date of last family contact:  7/9/21  Family requesting physician contact today:  no  Discharge plan:  The plans on staying with his mother once he is discharged   Guns in the home:  no   Outpatient provider(s):  None currently    Participating treatment team members: Oumar Prieto LMSW

## 2021-07-12 NOTE — PROGRESS NOTES
Discussed case interviewed patient  Patient is quite active on the unit  Mood is okay  Denied acute psychotic symptoms  Getting along well with peers and staff  No overt aggression  Attends groups  Eating and sleeping okay  Pledges for safety on the unit    Mental status exam  Alert oriented cooperative  Speech is goal-directed  Mood is okay affect is appropriate  Thought process linear and logical  Insight and judgment fair    Recommendations  Continue inpatient treatments  Medication regimen regimen reviewed including low-dose Celexa and olanzapine  Follow-up with psychiatric team again tomorrow

## 2021-07-12 NOTE — PROGRESS NOTES
Problem: Depressed Mood (Adult/Pediatric)  Goal: *STG: Attends activities and groups  Outcome: Progressing Towards Goal  Goal: *STG: Remains safe in hospital  Outcome: Progressing Towards Goal  Goal: *STG: Complies with medication therapy  Outcome: Progressing Towards Goal     Problem: Falls - Risk of  Goal: *Absence of Falls  Description: Document Destin Fall Risk and appropriate interventions in the flowsheet. Outcome: Progressing Towards Goal  Note: Fall Risk Interventions: The pt has been up in the dayroom watching T.V. and in the group room watching a movie with some of his peers. The pt remains safe in the hospital. No self injurious behavior reported or noted. The pt is med compliant. No falls reported or noted. The pt ambulates independently and without difficulty.

## 2021-07-12 NOTE — GROUP NOTE
[facility]  GROUP DOCUMENTATION INDIVIDUAL                                                                          Group Therapy Note    Date: 7/12/2021    Group Start Time: 1036  Group End Time: 1055  Group Topic: Nursing    [facility] 2 [facility]    Vern Almazan LPN    [facility] GROUP DOCUMENTATION GROUP    Group Therapy Note    Attendees: 9/14         Attendance: Other Pt. was not on this unit@ the time of this group    Patient's Goal: ID  Benefits of medications    Interventions/techniques: Follows Directions:     Interactions:     Mental Status:     Behavior/appearance:     Goals Achieved: Additional Notes:  Pt.  Was not on this unit @ the time of this group  Alfonza Spatz, LPN

## 2021-07-12 NOTE — BH NOTES
The pt has been visible on the unit. He interacts with the staff and his peers. His affect is WNLs and he has been smiling at times. He has been cooperative and pleasant. The pt watched movies with his peers in the group room. He has accepted snacks and something to drink. He is med compliant and received prn Trazodone. No c/o pain or discomfort. No c/o anxiety, depression, SI, HI, or hallucinations. The pt has been resting quietly in bed with his eyes closed. No distress noted or voiced. He remains on close observation for safety.

## 2021-07-13 PROCEDURE — 74011250637 HC RX REV CODE- 250/637: Performed by: PSYCHIATRY & NEUROLOGY

## 2021-07-13 PROCEDURE — 65220000003 HC RM SEMIPRIVATE PSYCH

## 2021-07-13 RX ADMIN — OLANZAPINE 2.5 MG: 2.5 TABLET, FILM COATED ORAL at 09:09

## 2021-07-13 RX ADMIN — OLANZAPINE 2.5 MG: 2.5 TABLET, FILM COATED ORAL at 21:16

## 2021-07-13 RX ADMIN — CITALOPRAM HYDROBROMIDE 10 MG: 10 TABLET ORAL at 09:00

## 2021-07-13 NOTE — GROUP NOTE
ISABEL  GROUP DOCUMENTATION INDIVIDUAL                                                                          Group Therapy Note    Date: 7/13/2021    Group Start Time: 1400  Group End Time: 1600  Group Topic: Special Track - Drug Topic    [facility]    Poly Melvin    IP [address] [facility] GROUP DOCUMENTATION GROUP    Group Therapy Note    Attendees: Patients discussed the stages of change and the changes example worksheets. Patients asked to reflect on what stage of change they are in, why they are in that stage and not another. Patients asked to reflect on the changes they need to make surrounding their substance use. Themes surrounding fear of change, fear of the unknown, and family support emerged and were discussed. Attendance: Attended    Patient's Goal:  Attends activities and groups    Interventions/techniques: Validated, Promoted peer support, Reinforced and Supported    Follows Directions: Followed directions    Interactions: Interacted appropriately    Mental Status: Calm, Congruent and Flat    Behavior/appearance: Attentive, Cooperative and Withdrawn/quiet    Goals Achieved: Able to engage in interactions and Able to listen to others      Additional Notes:  Pt attended group and was engaged. Pt shared that he feels like he is in relapse when he came into the hospital.  Pt did not share anything else, was seen nodding along to peers as they shared.     Gerhardt Fleet

## 2021-07-13 NOTE — BH NOTES
Writer attempted to contact Robbie Swenson [phone] 2x to complete Duty to Warn, no answer, VM full.

## 2021-07-13 NOTE — GROUP NOTE
[facility]  GROUP DOCUMENTATION INDIVIDUAL                                                                          Group Therapy Note    Date: 7/13/2021    Group Start Time: 1100  Group End Time: 8693  Group Topic: Psychological Group    [facility] CARE MANAGEMENT    Paola Ca    IP [address] [facility] GROUP DOCUMENTATION GROUP    Group Therapy Note    Attendees: There were 6 out of 13 attendants       The writer educated the group on how the brain understands trauma and the warning signs of post traumatic stress. The pts were also encouraged to provide feedback and support to their peers. Attendance: Attended    Patient's Goal:  To attend groups     Interventions/techniques: Promoted peer support and Supported    Follows Directions: Followed directions    Interactions: Interacted appropriately    Mental Status: Calm    Behavior/appearance: Cooperative and Withdrawn/quiet    Goals Achieved: Able to engage in interactions, Able to listen to others, Able to give feedback to another, Able to receive feedback and Able to self-disclose      Additional Notes: The pt was mostly withdrawn throughout the session as he is anxious about being released. He communicated that he understood what trauma is and even helped the writer by reading through the worksheet.      BRIANA Barry Intern

## 2021-07-13 NOTE — PROGRESS NOTES
Problem: Depressed Mood (Adult/Pediatric)  Goal: *STG: Verbalizes anger, guilt, and other feelings in a constructive manor  Outcome: Progressing Towards Goal     Problem: Depressed Mood (Adult/Pediatric)  Goal: *STG: Remains safe in hospital  Outcome: Progressing Towards Goal     Problem: Depressed Mood (Adult/Pediatric)  Goal: *STG: Complies with medication therapy  Outcome: Progressing Towards Goal     Patient has been able to express his feelings. Patient has been safe so far this shift  Patient was medication compliant. Patient remains on close observation. Patient has been alert, oriented, cooperative and pleasant. Patient has been mostly quiet. Patient has not voiced any depression, anxiety, SI or HI. Patient was medication compliant. Patient asked to sit in the hallway and read his book; this was allowed. He was later allowed to sit in the dayroom and read. Continue to assess. Since going to bed, patient has been laying down quietly with is eyes closed.

## 2021-07-13 NOTE — GROUP NOTE
[facility]  GROUP DOCUMENTATION INDIVIDUAL                                                                          Group Therapy Note    Date: 7/13/2021    Group Start Time: 1500  Group End Time: 8253  Group Topic: Process Group - Inpatient    [facility] CARE MANAGEMENT    Paola Ca    IP [address] [facility] GROUP DOCUMENTATION GROUP    Group Therapy Note    Attendees: There were 4 out of 13 attendants     The writer facilitated a mental health check in with the pts and games. Attendance: Attended    Patient's Goal:  To attend groups     Interventions/techniques: Promoted peer support and Provide feedback    Follows Directions: Followed directions    Interactions: Interacted appropriately    Mental Status: Calm    Behavior/appearance: Attentive and Cooperative    Goals Achieved: Able to engage in interactions, Able to listen to others, Able to receive feedback and Able to self-disclose      Additional Notes: The pt is anxious to leave the hospital so that he can turn himself in and complete the process needed to suffice his outstanding warrant. The pt is also looking forward to returning too work so that he doesn't get behind on paying child support.      Vincenzo Perez MSW Intern

## 2021-07-13 NOTE — PROGRESS NOTES
Progress Note  Date:2021       Room:[facility]  Patient Angela Brown     YOB: 1990     Age:30 y.o. Subjective    Subjective    Patient states he has been feeling better. He along with his  had contacted police to understand his legal situation. Patient states he has not harmed anyone. Patient states he does understand there is a legal situation that he will be facing. Patient denies having any auditory visual hallucinations or racing thoughts. He states his thoughts is much more clear. He usually does not have any hallucinations or blackouts if there is no stress. He states he is ready to go home forward in his life. He states he was triggered at what was sent to his text and pictures. He states he is a person to act and not sent threatening messages. He also states he realizes the consequences if he reacts to the situation. Denies any suicidal feelings thoughts intent or plan. He denies any homicidal feelings thoughts intent or plan he states that his mom has been talking with his wife and they have been communicating. Patient states he plans to discharge and stay with his mom and go on with his work. Mental status examination-patient is alert oriented x3 casually dressed groomed cooperative and engaging in treatment. Denies any active plan of suicide or homicide no acute distress needed no disruptive behavior has been appropriate and engagement and medications only. He hopes to get back to work as he is losing his neck in the morning. He denies any active plan of suicide or homicide no intent or plan voiced. No evidence of any active psychosis noted no persecutory delusions voiced.   Objective         Vitals Last 24 Hours:  TEMPERATURE:  Temp  Av.1 °F (36.7 °C)  Min: 98 °F (36.7 °C)  Max: 98.1 °F (36.7 °C)  RESPIRATIONS RANGE: Resp  Av  Min: 18  Max: 18  PULSE OXIMETRY RANGE: No data recorded  PULSE RANGE: Pulse  Av.5  Min: 70  Max: 99  BLOOD PRESSURE RANGE: Systolic (92OBF), FLF:437 , Min:119 , PCE:511   ; Diastolic (73BPI), TBA:22, Min:69, Max:75    I/O (24Hr): No intake or output data in the 24 hours ending 21  Objective  Labs/Imaging/Diagnostics    Labs:  CBC:No results for input(s): WBC, RBC, HGB, HCT, MCV, RDW, PLT, HGBEXT, HCTEXT, PLTEXT in the last 72 hours. CHEMISTRIES:No results for input(s): NA, K, CL, CO2, BUN, CA, PHOS, MG in the last 72 hours. No lab exists for component: CREATININE, GLUCOSEPT/INR:No results for input(s): INR, INREXT in the last 72 hours. No lab exists for component: PROTIME  APTT:No results for input(s): APTT in the last 72 hours. LIVER PROFILE:No results for input(s): AST, ALT in the last 72 hours. No lab exists for component: Olivia Abdul ALKPHOS  Lab Results   Component Value Date/Time    ALT (SGPT) 20 2021 11:10 PM    AST (SGOT) 12 (L) 2021 11:10 PM    Alk. phosphatase 105 2021 11:10 PM    Bilirubin, total 0.6 2021 11:10 PM       Imaging Last 24 Hours:  No results found. Assessment//Plan   Active Problems:    Depression with suicidal ideation (2021)      Depression (2021)      Assessment & Plan  Patient states his medications are helping him and he is able to rest well denies any racing thoughts denies any auditory visual hallucinations no command hallucinations no persecutory delusions. He states he feels ready to stepdown to lower level of care. Discussed with legal challenges that what he understands. We will continue to explore family meeting and stepdown plan and follow-up on if there is any duty to warn.   Discharge planning in 1 to 2 days    Current Facility-Administered Medications:     citalopram (CELEXA) tablet 10 mg, 10 mg, Oral, DAILY, Chelsie Macias MD, 10 mg at 21 1015    OLANZapine (ZyPREXA) tablet 5 mg, 5 mg, Oral, Q6H PRN, Chelsie Macias MD    benztropine (COGENTIN) tablet 1 mg, 1 mg, Oral, BID PRN, Nikki Vargas MD    hydrOXYzine HCL (ATARAX) tablet 50 mg, 50 mg, Oral, TID PRN, Nikki Vargas MD    traZODone (DESYREL) tablet 50 mg, 50 mg, Oral, QHS PRN, Chelsie Macias MD, 50 mg at 07/11/21 2113    acetaminophen (TYLENOL) tablet 650 mg, 650 mg, Oral, Q4H PRN, Nikki Vargas MD    magnesium hydroxide (MILK OF MAGNESIA) 400 mg/5 mL oral suspension 30 mL, 30 mL, Oral, DAILY PRN, Chelsie Macias MD    nicotine (NICODERM CQ) 21 mg/24 hr patch 1 Patch, 1 Patch, TransDERmal, DAILY, Chelsie Macias MD, 1 Patch at 07/12/21 1015    OLANZapine (ZyPREXA) tablet 2.5 mg, 2.5 mg, Oral, BID, Chelsie Macisa MD, 2.5 mg at 07/12/21 1015  Electronically signed by Pepe Reyes MD on 7/12/2021 at 8:21 PM

## 2021-07-14 VITALS
BODY MASS INDEX: 20.04 KG/M2 | DIASTOLIC BLOOD PRESSURE: 70 MMHG | HEIGHT: 70 IN | OXYGEN SATURATION: 100 % | RESPIRATION RATE: 18 BRPM | WEIGHT: 140 LBS | SYSTOLIC BLOOD PRESSURE: 109 MMHG | TEMPERATURE: 98.1 F | HEART RATE: 59 BPM

## 2021-07-14 PROCEDURE — 74011250637 HC RX REV CODE- 250/637: Performed by: PSYCHIATRY & NEUROLOGY

## 2021-07-14 RX ORDER — CITALOPRAM 10 MG/1
10 TABLET ORAL DAILY
Qty: 30 TABLET | Refills: 0 | Status: SHIPPED | OUTPATIENT
Start: 2021-07-15

## 2021-07-14 RX ORDER — TRAZODONE HYDROCHLORIDE 50 MG/1
50 TABLET ORAL
Qty: 30 TABLET | Refills: 0 | Status: SHIPPED | OUTPATIENT
Start: 2021-07-14

## 2021-07-14 RX ORDER — OLANZAPINE 2.5 MG/1
2.5 TABLET ORAL 2 TIMES DAILY
Qty: 60 TABLET | Refills: 0 | Status: SHIPPED | OUTPATIENT
Start: 2021-07-14

## 2021-07-14 RX ADMIN — CITALOPRAM HYDROBROMIDE 10 MG: 10 TABLET ORAL at 09:26

## 2021-07-14 RX ADMIN — OLANZAPINE 2.5 MG: 2.5 TABLET, FILM COATED ORAL at 09:26

## 2021-07-14 NOTE — PROGRESS NOTES
Problem: Depressed Mood (Adult/Pediatric)  Goal: *STG: Complies with medication therapy  7/14/2021 1627 by Edna Baires RN  Outcome: Resolved/Met  7/14/2021 1624 by Edna Baires RN  Outcome: Progressing Towards Goal  7/14/2021 1127 by Edna Baires RN  Outcome: Progressing Towards Goal     Problem: Patient Education: Go to Patient Education Activity  Goal: Patient/Family Education  7/14/2021 1627 by Edna Baires RN  Outcome: Resolved/Met  7/14/2021 1624 by Edna Baires RN  Outcome: Progressing Towards Goal  7/14/2021 1127 by Edna Baires RN  Outcome: Progressing Towards Goal     Problem: Depressed Mood (Adult/Pediatric)  Goal: *LTG: Returns to previous level of functioning and participates with after care plan  7/14/2021 1627 by Edna Baires RN  Outcome: Resolved/Met  7/14/2021 1624 by Edna Baires RN  Outcome: Progressing Towards Goal  7/14/2021 1127 by Edna Baires RN  Outcome: Progressing Towards Goal     Problem: Depressed Mood (Adult/Pediatric)  Goal: *LTG: Understands illness and can identify signs of relapse  7/14/2021 1627 by Edna Baires RN  Outcome: Resolved/Met  7/14/2021 1624 by Edna Baires RN  Outcome: Progressing Towards Goal  7/14/2021 1127 by Edna Baires RN  Outcome: Progressing Towards Goal     Problem: Depressed Mood (Adult/Pediatric)  Goal: Interventions  7/14/2021 1627 by Edna Baires RN  Outcome: Resolved/Met  7/14/2021 1624 by Edna Baires RN  Outcome: Progressing Towards Goal  7/14/2021 1127 by Edna Baires RN  Outcome: Progressing Towards Goal     Problem: Falls - Risk of  Goal: *Absence of Falls  Description: Document Eleazar Guzman Fall Risk and appropriate interventions in the flowsheet.   7/14/2021 1627 by Edna Baires RN  Outcome: Resolved/Met  Note: Fall Risk Interventions: 7/14/2021 1624 by Gonzales Valerio RN  Outcome: Progressing Towards Goal  Note: Fall Risk Interventions:                             7/14/2021 1127 by Gonzales Valerio RN  Outcome: Progressing Towards Goal  Note: Fall Risk Interventions:                                Problem: Patient Education: Go to Patient Education Activity  Goal: Patient/Family Education  7/14/2021 1627 by Gonzales Valerio RN  Outcome: Resolved/Met  7/14/2021 1624 by Gonzales Valerio RN  Outcome: Progressing Towards Goal  7/14/2021 1127 by Gonzales Valerio RN  Outcome: Progressing Towards Goal

## 2021-07-14 NOTE — GROUP NOTE
ISABEL  GROUP DOCUMENTATION INDIVIDUAL                                                                          Group Therapy Note    Date: 7/14/2021    Group Start Time: 1400  Group End Time: 1500  Group Topic: Special Track - Drug Topic    [facility]    Poly Melvin    IP [address] [facility] GROUP DOCUMENTATION GROUP    Group Therapy Note    Attendees: Patients discussed the thinking your way out of using sheet. Patients encouraged to discuss the cognitive distortions that they have that cause them to use. Patients learned about CBT, how thoughts, feelings, and actions are all connected. Patients discussed the power of positive thinking. Patients encouraged to share openly and honestly, without judgement. Patients encouraged to support and be respectful to their peers. Attendance: Did not attend        Additional Notes:  Pt encouraged but did not attend group.     Eusebio Conte

## 2021-07-14 NOTE — PROGRESS NOTES
Problem: Depressed Mood (Adult/Pediatric)  Goal: *STG: Attends activities and groups  Outcome: Progressing Towards Goal     Problem: Depressed Mood (Adult/Pediatric)  Goal: *STG: Remains safe in hospital  Outcome: Progressing Towards Goal     Problem: Depressed Mood (Adult/Pediatric)  Goal: *STG: Complies with medication therapy  Outcome: Progressing Towards Goal     Problem: Depressed Mood (Adult/Pediatric)  Goal: *LTG: Returns to previous level of functioning and participates with after care plan  Outcome: Progressing Towards Goal

## 2021-07-14 NOTE — BH NOTES
Behavioral Health Treatment Team Note     Patient goal(s) for today: \"stay positive\"  Treatment team focus/goals: medication management, group therapy, family session, DC planning    Progress note: Pt presented with full range affect and congruent mood. Pt denied active SI HI and AVH. Pt reported feeling like the medication he is taking is helpful. Writer facilitated family session and DC coordination with pt's mom. Both were educated on writer's responsibility of duty to warn, and both reported understanding. Both reported their plan is to go from the hospital to the  so that he can sort out legal concerns. Pt agreed to follow up with D[facility] to coordinate services once he has addressed legal concerns. Plan to DC tomorrow at 1700.      LOS:  6  Expected LOS: 7    Insurance info/prescription coverage:  [facility]  Date of last family contact:  7/13/21  Family requesting physician contact today:  no  Discharge plan:  The plans on staying with his mother once he is discharged and follow up with [facility]  Guns in the home:  no   Outpatient provider(s): D[facility]    Participating treatment team members: Bishnu Richards, * (assigned SW), José Miguel Reid LMSW

## 2021-07-14 NOTE — BH NOTES
Behavioral Health Transition Record to Provider    Patient Name: Van Rodriges  YOB: 1990  Medical Record Number: [id number]  Date of Admission: 7/6/2021  Date of Discharge: 7/17/2021    Attending Provider: Renaldo Hidalgo MD  Discharging Provider: Dr. Shweta Tomlinson  To contact this individual call [phone] and ask the  to page. If unavailable, ask to be transferred to [address] [place] [address] Provider on call. [facility] Provider will be available on call 24/7 and during holidays. Primary Care Provider: None    No Known Allergies    Reason for Admission: Pt admitted d/t complaints of SI and AH telling him to \"do bad things\". Pt reported poor appetite and sleep, and an incident happened where he \"blacked out\" and cannot remember. Admission Diagnosis: Depression with suicidal ideation [F32.9, R45.851]  Depression [F32.9]    * No surgery found *    Results for orders placed or performed during the hospital encounter of 07/06/21   SARS-COV-2   Result Value Ref Range    SARS-CoV-2 Not Detected Not Detected     CBC WITH AUTOMATED DIFF   Result Value Ref Range    WBC 10.5 4.1 - 11.1 K/uL    RBC 5.10 4. 10 - 5.70 M/uL    HGB 15.3 12.1 - 17.0 g/dL    HCT 45.3 36.6 - 50.3 %    MCV 88.8 80.0 - 99.0 FL    MCH 30.0 26.0 - 34.0 PG    MCHC 33.8 30.0 - 36.5 g/dL    RDW 13.9 11.5 - 14.5 %    PLATELET 625 044 - 537 K/uL    MPV 11.6 8.9 - 12.9 FL    NRBC 0.0 0.0  WBC    ABSOLUTE NRBC 0.00 0.00 - 0.01 K/uL    NEUTROPHILS 69 32 - 75 %    LYMPHOCYTES 24 12 - 49 %    MONOCYTES 6 5 - 13 %    EOSINOPHILS 1 0 - 7 %    BASOPHILS 0 0 - 1 %    IMMATURE GRANULOCYTES 0 0 - 0.5 %    ABS. NEUTROPHILS 7.2 1.8 - 8.0 K/UL    ABS. LYMPHOCYTES 2.5 0.8 - 3.5 K/UL    ABS. MONOCYTES 0.7 0.0 - 1.0 K/UL    ABS. EOSINOPHILS 0.1 0.0 - 0.4 K/UL    ABS. BASOPHILS 0.0 0.0 - 0.1 K/UL    ABS. IMM.  GRANS. 0.0 0.00 - 0.04 K/UL    DF AUTOMATED     METABOLIC PANEL, COMPREHENSIVE   Result Value Ref Range    Sodium 134 (L) 136 - 145 mmol/L    Potassium 3.2 (L) 3.5 - 5.1 mmol/L    Chloride 103 97 - 108 mmol/L    CO2 26 21 - 32 mmol/L    Anion gap 5 5 - 15 mmol/L    Glucose 107 (H) 65 - 100 mg/dL    BUN 8 6 - 20 mg/dL    Creatinine 0.90 0.70 - 1.30 mg/dL    BUN/Creatinine ratio 9 (L) 12 - 20      GFR est AA >60 >60 ml/min/1.73m2    GFR est non-AA >60 >60 ml/min/1.73m2    Calcium 9.5 8.5 - 10.1 mg/dL    Bilirubin, total 0.6 0.2 - 1.0 mg/dL    AST (SGOT) 12 (L) 15 - 37 U/L    ALT (SGPT) 20 12 - 78 U/L    Alk. phosphatase 105 45 - 117 U/L    Protein, total 8.5 (H) 6.4 - 8.2 g/dL    Albumin 4.4 3.5 - 5.0 g/dL    Globulin 4.1 (H) 2.0 - 4.0 g/dL    A-G Ratio 1.1 1.1 - 2.2     ETHYL ALCOHOL   Result Value Ref Range    ALCOHOL(ETHYL),SERUM <4 <61 mg/dL   SALICYLATE   Result Value Ref Range    Salicylate level 5.4 2.8 - 20.0 mg/dL    Reported dose date Not provided      Reported dose: Not provided Units   ACETAMINOPHEN   Result Value Ref Range    Acetaminophen level <10 (L) 10 - 30 ug/mL    Reported dose date Not provided      Reported dose: Not provided Units   DRUG SCREEN, URINE   Result Value Ref Range    AMPHETAMINES Negative Negative      BARBITURATES Negative Negative      BENZODIAZEPINES Negative Negative      COCAINE Negative Negative      METHADONE Negative Negative      OPIATES Negative Negative      PCP(PHENCYCLIDINE) Negative Negative      THC (TH-CANNABINOL) Positive (A) Negative      Drug screen comment        This test is a screen for drugs of abuse in a medical setting only (i.e., they are unconfirmed results and as such must not be used for non-medical purposes, e.g.,employment testing, legal testing). Due to its inherent nature, false positive (FP) and false negative (FN) results may be obtained. Therefore, if necessary for medical care, recommend confirmation of positive findings by GC/MS. Immunizations administered during this encounter: There is no immunization history on file for this patient.     Screening for Metabolic Disorders for Patients on Antipsychotic Medications  (Data obtained from the EMR)    Estimated Body Mass Index  Estimated body mass index is 20.09 kg/m² as calculated from the following:    Height as of this encounter: 5' 10\" (1.778 m). Weight as of this encounter: 63.5 kg (140 lb). Vital Signs/Blood Pressure  Visit Vitals  /70   Pulse (!) 59   Temp 98.1 °F (36.7 °C)   Resp 18   Ht 5' 10\" (1.778 m)   Wt 63.5 kg (140 lb)   SpO2 100%   BMI 20.09 kg/m²       Blood Glucose/Hemoglobin A1c  Lab Results   Component Value Date/Time    Glucose 107 (H) 07/06/2021 11:10 PM       No results found for: HBA1C, [id number]     Lipid Panel  No results found for: CHOL, CHOLX, CHLST, CHOLV, 722868, HDL, HDLP, LDL, LDLC, DLDLP, TGLX, TRIGL, TRIGP, CHHD, CHHDX     Discharge Diagnosis:  Depression with suicidal ideation [F32.9, R45.851]  Depression [F32.9]    Discharge Plan: Pt will DC home with mom who agreed to drive him straight to the [facility] to address warrant. Writer educated pt and mom that she will contact police to complete Duty to [address] [place] [address] prior to DC. Both agreed. Pt denied SI HI and AVH and plans to follow up with [place] [id number] once legal matters are addressed. Discharge Medication List and Instructions: There are no discharge medications for this patient. Unresulted Labs (24h ago, onward)    None        To obtain results of studies pending at discharge, please contact [phone]    Follow-up Information     Follow up With Specialties Details Why [address] [place] [address] 19  Call To coordinate community services Same Day Access  [phone]  [address] [facility]  Call As needed [phone]          Advanced Directive:   Does the patient have an appointed surrogate decision maker? No  Does the patient have a Medical Advance Directive? No  Does the patient have a Psychiatric Advance Directive?  No  If the patient does not have a surrogate or Medical Advance Directive AND Psychiatric Advance Directive, the patient was offered information on these advance directives Patient declined to complete    Patient Instructions: Please continue all medications until otherwise directed by physician. Tobacco Cessation Discharge Plan:   Is the patient a smoker and needs referral for smoking cessation? Yes  Patient referred to the following for smoking cessation with an appointment? Refused     Patient was offered medication to assist with smoking cessation at discharge? Yes  Was education for smoking cessation added to the discharge instructions? Yes    Alcohol/Substance Abuse Discharge Plan:   Does the patient have a history of substance/alcohol abuse and requires a referral for treatment? Yes  Patient referred to the following for substance/alcohol abuse treatment with an appointment? No  Patient was offered medication to assist with alcohol cessation at discharge? No  Was education for substance/alcohol abuse added to discharge instructions?  Yes    Patient discharged to Home; discussed with patient/caregiver

## 2021-07-14 NOTE — SUICIDE SAFETY PLAN
SAFETY PLAN    A suicide Safety Plan is a document that supports someone when they are having thoughts of suicide. Warning Signs that indicate a suicidal crisis may be developing: What (situations, thoughts, feelings, body sensations, behaviors, etc.) do you experience that lets you know you are beginning to think about suicide? 1. Things that are out of my control  2. My past (bad memories)  3. Numb all over    Internal Coping Strategies:  What things can I do (relaxation techniques, hobbies, physical activities, etc.) to take my mind off my problems without contacting another person? 1. Take a walk  2. Take a trip in the car  3. Call a friend    People and social settings that provide distraction: Who can I call or where can I go to distract me? 1. Name: Marce Gastelum (mom)  Phone: [phone]  2. Name: Chilango Briggs (friend)  Phone: [phone]   3. Place: Drive to see a movie            4. Place: [place]    People whom I can ask for help: Who can I call when I need help - for example, friends, family, clergy, someone else? 1. Name: [place] [address]                Phone: [phone]  2. Name: Kary Cadet [facility]  Phone: [phone]  3. Name: Bhavik  Phone: [phone]    Professionals or [facility] agencies I can contact during a crisis: Who can I call for help - for example, my doctor, my psychiatrist, my psychologist, a mental health provider, a suicide hotline? 1. Clinician Name: [facility]   Phone: [phone]      Clinician Pager or Emergency Contact #: [phone]    6. Clinician Name: Kary Cadet   Phone: [phone]      Clinician Pager or Emergency Contact #: [phone]    2. Suicide Prevention Lifeline: 9-662-180-TALK (9125)    4. [address] [place] [address] Emergency Services -  for example, [facility] suicide hotline, [facility] Hotline: [facility]      Emergency Services Address:       Emergency Services Phone: [phone]    Making the environment safe:  How can I make my environment (house/apartment/living space) safer? For example, can I remove guns, medications, and other items? 1. Throw anything that resembles a weapon out  2.  Quit smoking weed

## 2021-07-14 NOTE — BH NOTES
DISCHARGE SUMMARY    Raquel Duval  : 1990  MRN: [id number]    The patient Cleo Nails exhibits the ability to control behavior in a less restrictive environment. Patient's level of functioning is improving. No assaultive/destructive behavior has been observed for the past 24 hours. No suicidal/homicidal threat or behavior has been observed for the past 24 hours. There is no evidence of serious medication side effects. Patient has not been in physical or protective restraints for at least the past 24 hours. If weapons involved, how are they secured? Pt and mom denied    Is patient aware of and in agreement with discharge plan? yes    Arrangements for medication:  Prescriptions given to patient, given a weeks supply or 30 day supply. Copy of discharge instructions to provider?:  No, Pt will establish services after coordination of legal concerns    Arrangements for transportation home:  Yes, mother    Keep all follow up appointments as scheduled, continue to take prescribed medications per physician instructions.   Mental health crisis number:  160 or your local mental health crisis line number at [address] [facility] Emergency WARM LINE      2-790-454-MHAV (2854)      M-F: 9am to 9pm      Sat & Sun: 5pm  9pm  National suicide prevention lines:                             4-981-YNIRSZI (1-417-960-477-917-8637)       5-752-057-TALK (1-479.401.7903)    Crisis Text Line:  Text HOME to 346273

## 2021-07-14 NOTE — BH NOTES
Patient discharged in stable mental and physical health. Patient verbalized understanding of discharge instructions. Belongings from storage returned to patient. Patient denied SI/HI/AVH. Patient was calm, pleasant and cooperative. No physical complaints at time of discharge. Patient refused tobacco cessation. Patient was escorted off unit at 1610, leaving in a personal vehicle with his mother.

## 2021-07-14 NOTE — GROUP NOTE
[facility] GROUP DOCUMENTATION INDIVIDUAL                                                                          Group Therapy Note    Date: 7/14/2021    Group Start Time: 1300  Group End Time: 1400  Group Topic: Process Group - Inpatient    [facility]    Poly Read    IP [address] [facility] GROUP DOCUMENTATION GROUP    Group Therapy Note  The therapist facilitated a process group with the patient and allowed them to ventilate their thoughts and feelings. Attendees: 9         Attendance: Attended    Patient's Goal:  To attend groups    Interventions/techniques: Challenged, Informed, Validated and Supported    Follows Directions: Followed directions    Interactions: Interacted appropriately    Mental Status: Calm and Happy    Behavior/appearance: Attentive and Cooperative    Goals Achieved: Able to engage in interactions, Able to listen to others, Able to give feedback to another, Able to reflect/comment on own behavior and Able to self-disclose      Additional Notes: The patient spoke a lot about his substance use, and was glorifying marijuana use. He said that he grows it himself, and does not consider it a negative drug at all. He said that \"if anything, I believe that it has helped me\". He said that he makes him feel more calm.      Jose Alberto Amezcua

## 2021-07-14 NOTE — GROUP NOTE
IP  GROUP DOCUMENTATION INDIVIDUAL                                                                          Group Therapy Note    Date: 7/13/2021    Group Start Time: 1900  Group End Time: 1950  Group Topic: Recreational/Music Therapy    [facility]  NON ACUTE    Ghanshyam Backer    IP [address] [facility] GROUP DOCUMENTATION GROUP    Group Therapy Note    Attendees: 11/12  Introduce healthy leisure task skill as positive way to cope and manage mood. Attendance: Attended    Patient's Goal: STG: Attends activities and groups        Interventions/techniques: Art integration and Supported    Follows Directions: Followed directions    Interactions: Interacted appropriately    Mental Status: Calm and Flat    Behavior/appearance: Attentive and Cooperative    Goals Achieved: Able to engage in interactions and Able to listen to others      Additional Notes: Pt attended group and actively participated. Pt was receptive to intervention. Accepted leisure packet and worked on task in group. Was able to select a song to listen to and interacted with staff and peers. Was interactive with prompts and used leisure time constructively.    Mo Child, CTRS

## 2021-07-14 NOTE — PROGRESS NOTES
Problem: Depressed Mood (Adult/Pediatric)  Goal: *STG: Remains safe in hospital  Outcome: Progressing Towards Goal     Problem: Depressed Mood (Adult/Pediatric)  Goal: *STG: Complies with medication therapy  Outcome: Progressing Towards Goal     Problem: Falls - Risk of  Goal: *Absence of Falls  Description: Document Destin Fall Risk and appropriate interventions in the flowsheet. Outcome: Progressing Towards Goal  Note: Fall Risk Interventions:       Patient has remained safe so far this shift. Patient was medication compliant. Patient has not fallen so far this shift. Patient remains on close observation. Patient has been alert, oriented, cooperative and pleasant. Patient has been up on the unit. He was once seen doing pushups in the hallway. Patient was medication compliant. Continue to assess. Since going to bed, patient has been laying down quietly with his eyes closed.

## 2021-07-14 NOTE — PROGRESS NOTES
Progress Note  Date:2021       Room:[facility] [place]  Patient Claudia Harris     YOB: 1990     Age:30 y.o. Subjective    Subjective:  Symptoms:  No anxiety. Diet:  Adequate intake. has been doing better, no si/hi  Wants to go home  Review of Systems   Psychiatric/Behavioral: Negative for behavioral problems, dysphoric mood, self-injury, sleep disturbance and suicidal ideas. Objective         Vitals Last 24 Hours:  TEMPERATURE:  Temp  Av.3 °F (36.8 °C)  Min: 98.3 °F (36.8 °C)  Max: 98.3 °F (36.8 °C)  RESPIRATIONS RANGE: Resp  Av  Min: 18  Max: 18  PULSE OXIMETRY RANGE: SpO2  Av %  Min: 100 %  Max: 100 %  PULSE RANGE: Pulse  Av.5  Min: 67  Max: 72  BLOOD PRESSURE RANGE: Systolic (49OFW), ORP:040 , Min:113 , YRJ:233   ; Diastolic (44VSX), TDH:92, Min:73, Max:76    I/O (24Hr): No intake or output data in the 24 hours ending 21  Objective:  General Appearance:  Well-appearing. Vital signs: (most recent): Blood pressure 122/76, pulse 72, temperature 98.3 °F (36.8 °C), resp. rate 18, height 5' 10\" (1.778 m), weight 63.5 kg (140 lb), SpO2 100 %. Vital signs are normal.    Neurological: Patient is alert and oriented to person, place and time. Labs/Imaging/Diagnostics    Labs:  CBC:No results for input(s): WBC, RBC, HGB, HCT, MCV, RDW, PLT, HGBEXT, HCTEXT, PLTEXT in the last 72 hours. CHEMISTRIES:No results for input(s): NA, K, CL, CO2, BUN, CA, PHOS, MG in the last 72 hours. No lab exists for component: CREATININE, GLUCOSEPT/INR:No results for input(s): INR, INREXT in the last 72 hours. No lab exists for component: PROTIME  APTT:No results for input(s): APTT in the last 72 hours. LIVER PROFILE:No results for input(s): AST, ALT in the last 72 hours.     No lab exists for component: MARIS Doe  Lab Results   Component Value Date/Time    ALT (SGPT) 20 2021 11:10 PM    AST (SGOT) 12 (L) 07/06/2021 11:10 PM    Alk. phosphatase 105 07/06/2021 11:10 PM    Bilirubin, total 0.6 07/06/2021 11:10 PM       Imaging Last 24 Hours:  No results found. Assessment//Plan   Active Problems:    Depression with suicidal ideation (7/7/2021)      Depression (7/7/2021)      Assessment:    Condition: In stable condition. Plan:   Discharge home.       Current Facility-Administered Medications:     citalopram (CELEXA) tablet 10 mg, 10 mg, Oral, DAILY, Chelsie Macias MD, 10 mg at 07/13/21 0900    OLANZapine (ZyPREXA) tablet 5 mg, 5 mg, Oral, Q6H PRN, Halina Graham MD    benztropine (COGENTIN) tablet 1 mg, 1 mg, Oral, BID PRN, Halina Graham MD    hydrOXYzine HCL (ATARAX) tablet 50 mg, 50 mg, Oral, TID PRN, Halina Graham MD    traZODone (DESYREL) tablet 50 mg, 50 mg, Oral, QHS PRN, Chelsie Macias MD, 50 mg at 07/11/21 2113    acetaminophen (TYLENOL) tablet 650 mg, 650 mg, Oral, Q4H PRN, Halina Graham MD    magnesium hydroxide (MILK OF MAGNESIA) 400 mg/5 mL oral suspension 30 mL, 30 mL, Oral, DAILY PRN, Chelsie Macias MD    nicotine (NICODERM CQ) 21 mg/24 hr patch 1 Patch, 1 Patch, TransDERmal, DAILY, Chelsie Macias MD, 1 Patch at 07/13/21 0900    OLANZapine (ZyPREXA) tablet 2.5 mg, 2.5 mg, Oral, BID, Chelsie Macias MD, 2.5 mg at 07/13/21 2116    Electronically signed by Tiffanie Stephen MD on 7/13/2021 at 11:06 PM

## 2021-07-14 NOTE — GROUP NOTE
[facility]  GROUP DOCUMENTATION INDIVIDUAL                                                                          Group Therapy Note    Date: 7/14/2021    Group Start Time: 1100  Group End Time: 1140  Group Topic: Education Group - Inpatient    [facility]    Charline Kumar    IP [address] [facility] GROUP DOCUMENTATION GROUP    Group Therapy Note    Attendees: 7/13    Psych Ed: pts were asked to share how they were doing as a check in question. Pts were then encouraged to share their warning signs, coping skills and people they can reach out to. Pts were encouraged to also provide positive feedback to peers. Pts were then asked to reflect on group         Attendance: Attended    Patient's Goal:  Pt came to group a few minutes late and did not share how he was doing . Interventions/techniques: Validated, Promoted peer support, Provide feedback and Supported    Follows Directions: Followed directions    Interactions: Interacted appropriately    Mental Status: Calm, Congruent and Flat    Behavior/appearance: Attentive, Motivated, Neatly groomed and Withdrawn/quiet    Goals Achieved: Able to engage in interactions, Able to listen to others, Able to reflect/comment on own behavior and Able to manage/cope with feelings      Additional Notes:  Pt shared that he had already finished a safety plan. Pt listened to peers.  Pt is making progress towards goals by attending and participating in group    [facility]

## 2021-07-15 NOTE — BH NOTES
Writer contacted [employer] with [employer] office to complete Duty to Warn, making them aware of pt's DC and plans to go directly to [facility]. Writer also reported not being able to get in touch with other party, Nina Hernandez. Capt. Alyson Chandler reported he would make [facility] aware.

## 2022-03-18 PROBLEM — R45.851 DEPRESSION WITH SUICIDAL IDEATION: Status: ACTIVE | Noted: 2021-07-07

## 2022-03-18 PROBLEM — F32.A DEPRESSION WITH SUICIDAL IDEATION: Status: ACTIVE | Noted: 2021-07-07

## 2022-03-19 PROBLEM — F32.A DEPRESSION: Status: ACTIVE | Noted: 2021-07-07

## 2023-05-14 RX ORDER — TRAZODONE HYDROCHLORIDE 50 MG/1
50 TABLET ORAL
COMMUNITY
Start: 2021-07-14

## 2023-05-14 RX ORDER — OLANZAPINE 2.5 MG/1
2.5 TABLET ORAL 2 TIMES DAILY
COMMUNITY
Start: 2021-07-14

## 2023-05-14 RX ORDER — CITALOPRAM 10 MG/1
10 TABLET ORAL DAILY
COMMUNITY
Start: 2021-07-15